# Patient Record
Sex: MALE | Race: OTHER | NOT HISPANIC OR LATINO | URBAN - METROPOLITAN AREA
[De-identification: names, ages, dates, MRNs, and addresses within clinical notes are randomized per-mention and may not be internally consistent; named-entity substitution may affect disease eponyms.]

---

## 2019-08-04 ENCOUNTER — EMERGENCY (EMERGENCY)
Facility: HOSPITAL | Age: 24
LOS: 1 days | Discharge: HOME | End: 2019-08-04
Attending: EMERGENCY MEDICINE
Payer: COMMERCIAL

## 2019-08-04 VITALS
TEMPERATURE: 98 F | HEIGHT: 74 IN | HEART RATE: 87 BPM | DIASTOLIC BLOOD PRESSURE: 91 MMHG | OXYGEN SATURATION: 98 % | WEIGHT: 210.1 LBS | SYSTOLIC BLOOD PRESSURE: 157 MMHG | RESPIRATION RATE: 16 BRPM

## 2019-08-04 PROCEDURE — 73610 X-RAY EXAM OF ANKLE: CPT

## 2019-08-04 PROCEDURE — 73610 X-RAY EXAM OF ANKLE: CPT | Mod: 26,LT

## 2019-08-04 PROCEDURE — 99283 EMERGENCY DEPT VISIT LOW MDM: CPT

## 2019-08-04 PROCEDURE — 99283 EMERGENCY DEPT VISIT LOW MDM: CPT | Mod: 25

## 2019-08-04 NOTE — ED ADULT NURSE NOTE - NSIMPLEMENTINTERV_GEN_ALL_ED
Implemented All Universal Safety Interventions:  [place] to call system. Call bell, personal items and telephone within reach. Instruct patient to call for assistance. Room bathroom lighting operational. Non-slip footwear when patient is off stretcher. Physically safe environment: no spills, clutter or unnecessary equipment. Stretcher in lowest position, wheels locked, appropriate side rails in place.

## 2019-08-04 NOTE — ED PROVIDER NOTE - CARE PLAN
Principal Discharge DX:	Ankle fracture, left, closed, initial encounter  Assessment and plan of treatment:	aircast. f/u podiatry clinic.

## 2019-08-04 NOTE — ED PROVIDER NOTE - PHYSICAL EXAMINATION
Left Ankle Exam:  Tenderness and swelling over the distal aspect of the lateral malleolus  Extension and flexion, plantar flexion normal   Patient able to walk  No tenderness over metatarsal, toes and medial malleolus

## 2019-08-04 NOTE — ED PROVIDER NOTE - OBJECTIVE STATEMENT
22 y/o M patient w/ no significant PMHx and no significant PSHx presents to the ED with left ankle pain s/p inversion injury yesterday. Patient reports he was walking in the street when he twisted his ankle. Patient says he initially did not have much pain yesterday, however, patient says today his pain progressively worsened. Patient endorses pain over the lateral malleolus. Patient denies any other complaints. NKDA.

## 2019-08-04 NOTE — ED PROVIDER NOTE - PROGRESS NOTE DETAILS
tiny avulsion fx. podiatry paged tiny avulsion fx. podiatry paged.   spoke with podiatrist, Dr Medina. recommends aircast and f/u in clinic.

## 2019-08-04 NOTE — ED PROVIDER NOTE - NSFOLLOWUPINSTRUCTIONS_ED_ALL_ED_FT
Rest, Ice, Elevation. Ibuprofen for the pain.  follow up with Podiatry Clinic. [id number] [facility].